# Patient Record
Sex: MALE | HISPANIC OR LATINO | Employment: FULL TIME | URBAN - METROPOLITAN AREA
[De-identification: names, ages, dates, MRNs, and addresses within clinical notes are randomized per-mention and may not be internally consistent; named-entity substitution may affect disease eponyms.]

---

## 2017-03-24 ENCOUNTER — NON-PROVIDER VISIT (OUTPATIENT)
Dept: URGENT CARE | Facility: CLINIC | Age: 22
End: 2017-03-24

## 2017-03-24 DIAGNOSIS — Z02.1 PRE-EMPLOYMENT DRUG SCREENING: ICD-10-CM

## 2017-03-24 LAB
AMP AMPHETAMINE: NORMAL
COC COCAINE: NORMAL
INT CON NEG: NORMAL
INT CON POS: NORMAL
MET METHAMPHETAMINES: NORMAL
OPI OPIATES: NORMAL
PCP PHENCYCLIDINE: NORMAL
POC DRUG COMMENT 753798-OCCUPATIONAL HEALTH: NORMAL
THC: NORMAL

## 2017-03-24 PROCEDURE — 80305 DRUG TEST PRSMV DIR OPT OBS: CPT | Performed by: PHYSICIAN ASSISTANT

## 2019-03-27 ENCOUNTER — OFFICE VISIT (OUTPATIENT)
Dept: URGENT CARE | Facility: CLINIC | Age: 24
End: 2019-03-27

## 2019-03-27 DIAGNOSIS — Z01.89 RESPIRATORY CLEARANCE EXAMINATION, ENCOUNTER FOR: ICD-10-CM

## 2019-03-27 PROCEDURE — 8916 PR CLEARANCE ONLY: Performed by: PHYSICIAN ASSISTANT

## 2019-03-27 NOTE — PROGRESS NOTES
Saúl Wallis Jasper is a 24 y.o. male here for a non-provider visit for mask fit respiratory clearance    If abnormal was an in office provider notified today (if so, indicate provider)? Yes  Routed to PCP? No

## 2019-04-06 ENCOUNTER — OCCUPATIONAL MEDICINE (OUTPATIENT)
Dept: URGENT CARE | Facility: CLINIC | Age: 24
End: 2019-04-06
Payer: COMMERCIAL

## 2019-04-06 VITALS
WEIGHT: 196 LBS | HEIGHT: 67 IN | RESPIRATION RATE: 16 BRPM | OXYGEN SATURATION: 95 % | DIASTOLIC BLOOD PRESSURE: 84 MMHG | SYSTOLIC BLOOD PRESSURE: 122 MMHG | TEMPERATURE: 99.1 F | HEART RATE: 72 BPM | BODY MASS INDEX: 30.76 KG/M2

## 2019-04-06 DIAGNOSIS — S01.81XA CHIN LACERATION, INITIAL ENCOUNTER: ICD-10-CM

## 2019-04-06 PROCEDURE — 90715 TDAP VACCINE 7 YRS/> IM: CPT | Mod: 29 | Performed by: PHYSICIAN ASSISTANT

## 2019-04-06 PROCEDURE — 90471 IMMUNIZATION ADMIN: CPT | Mod: 29 | Performed by: PHYSICIAN ASSISTANT

## 2019-04-06 PROCEDURE — 12013 RPR F/E/E/N/L/M 2.6-5.0 CM: CPT | Mod: 29 | Performed by: PHYSICIAN ASSISTANT

## 2019-04-06 ASSESSMENT — ENCOUNTER SYMPTOMS
PALPITATIONS: 0
COUGH: 0
FEVER: 0
SHORTNESS OF BREATH: 0

## 2019-04-06 NOTE — LETTER
[facility] [address] - [place] [address]  Phone:  [phone] - Fax:  [phone]   [facility] Progress Report and Disability Certification  Date of Service: 4/6/2019   No Show:  No  Date / Time of Next Visit:     Claim Information   Patient Name: Saúl Hutchinson  Claim Number:     Employer: [employer]  Date of Injury: 4/6/2019     Insurer / TPA: [place] [employer]  ID / SSN:     Occupation: [employer]  Diagnosis: The encounter diagnosis was Chin laceration, initial encounter.    Medical Information   Related to Industrial Injury? Yes    Subjective Complaints:  DOI: 4/6/2019.  Pt was at work when he was breaking stakes in the ground with a metal bar when it hit him in the chin causing a cut.  He denies LOC or headache.  Tetanus unknown. No second job.  No previous injury.   Objective Findings: Constitutional: Pt is oriented to person, place, and time. He appears well-developed and well-nourished. No distress.   HENT:   Mouth/Throat: Oropharynx is clear and moist.   Eyes: Conjunctivae are normal.   Cardiovascular: Normal rate.    Pulmonary/Chest: Effort normal.   Musculoskeletal:   Neurological: Pt is alert and oriented to person, place, and time. Coordination normal.   Skin: Skin is warm. 3 cm laceration of chin  Psychiatric: Pt has a normal mood and affect. His behavior is normal.      Pre-Existing Condition(s):     Assessment:   Initial Visit    Status: Additional Care Required  Permanent Disability:No    Plan:      Diagnostics:      Comments:       Disability Information   Status: Released to Full Duty    From:     Through:   Restrictions are:     Physical Restrictions   Sitting:    Standing:    Stooping:    Bending:      Squatting:    Walking:    Climbing:    Pushing:      Pulling:    Other:    Reaching Above Shoulder (L):   Reaching Above Shoulder (R):       Reaching Below Shoulder (L):    Reaching Below Shoulder (R):      Not to exceed Weight  Limits   Carrying(hrs):   Weight Limit(lb):   Lifting(hrs):   Weight  Limit(lb):     Comments: 4 sutures placed. TDAP given. Keep wound clean and covered with topical antibiotic ointment and bandage.  Return in 5-7 days for removal.    Repetitive Actions   Hands: i.e. Fine Manipulations from Grasping:     Feet: i.e. Operating Foot Controls:     Driving / Operate Machinery:     Physician Name: Akin Arellano P.A.-C. Physician Signature: AKIN Rendon P.A.-C. e-Signature: Dr. Hayden Lara, Medical Director   Clinic Name / Location: [address] [place] [address], [place] [address] Clinic Phone Number: Dept: [phone]   Appointment Time: 11:00 Am Visit Start Time: 11:39 AM   Check-In Time:  11:11 Am Visit Discharge Time: 12:41 PM   Original-Treating Physician or Chiropractor    Page 2-Insurer/TPA    Page 3-Employer    Page 4-Employee

## 2019-04-06 NOTE — LETTER
"EMPLOYEE’S CLAIM FOR COMPENSATION/ REPORT OF INITIAL TREATMENT  FORM C-4    EMPLOYEE’S CLAIM - PROVIDE ALL INFORMATION REQUESTED   First Name  Saúl Last Name  Jasper Birthdate                    1995                Sex  male Claim Number   Home Address  [address] Age  24 y.o. Height  1.702 m (5' 7\") Weight  88.9 kg (196 lb) [facility]     [place] Zip  [address] Telephone  [phone] (home)    Mailing Address  [address] [place] Zip  [address] Primary Language Spoken  English    Insurer  [place] [employer] Third Party   [employer] [employer]   Employee's Occupation (Job Title) When Injury or Occupational Disease Occurred  [employer]    Employer's Name  [employer]  Telephone  [phone]    Employer Address  [address]  [place] [place] [facility] [place]  Zip  [address]    Date of Injury  4/6/2019               Hour of Injury  9:00 AM Date Employer Notified  4/6/2019 Last Day of Work after Injury or Occupational Disease  4/6/2019 Supervisor to Whom Injury Reported  [facility]   Address or Location of Accident (if applicable)  [[address]   What were you doing at the time of accident? (if applicable)  Breaking stakes of ground    How did this injury or occupational disease occur? (Be specific an answer in detail. Use additional sheet if necessary)  My injury happened by breaking stakes of ground with a bar they were deep in ground   If you believe that you have an occupational disease, when did you first have knowledge of the disability and it relationship to your employment?  N/A Witnesses to the Accident  Lobo Darby      Nature of Injury or Occupational Disease  Laceration  Part(s) of Body Injured or Affected  Facial Bones, ,     I certify that the above is true and correct to the best of my knowledge and that I have provided this information in order to obtain the benefits of [facility] [employer] and " Occup[place] inclusive or Chapter 617 of [place]).  I hereby authorize any physician, chiropractor, surgeon, practitioner, or other person, any hospital, including [facility] or [facility] hospital, any medical service organization, any insurance company, or other institution or organization to release to each other, any medical or other information, including benefits paid or payable, pertinent to this injury or disease, except information relative to diagnosis, treatment and/or counseling for AIDS, psychological conditions, alcohol or controlled substances, for which I must give specific authorization.  A Photostat of this authorization shall be as valid as the original.     Date   Place   Employee’s Signature   THIS REPORT MUST BE COMPLETED AND MAILED WITHIN 3 WORKING DAYS OF TREATMENT   Place  [facility]  Name of Facility  [facility]   Date  4/6/2019 Diagnosis  (S01.81XA) Chin laceration, initial encounter Is there evidence the injured employee was under the influence of alcohol and/or another controlled substance at the time of accident?   Hour  11:39 AM Description of Injury or Disease  The encounter diagnosis was Chin laceration, initial encounter. No   Treatment  4 sutures placed. Updated Tdap.  Keep area covered and clean.  Have you advised the patient to remain off work five days or more? No   X-Ray Findings      If Yes   From Date  To Date      From information given by the employee, together with medical evidence, can you directly connect this injury or occupational disease as job incurred?  Yes If No Full Duty  Yes Modified Duty      Is additional medical care by a physician indicated?  Yes If Modified Duty, Specify any Limitations / Restrictions      Do you know of any previous injury or disease contributing to this condition or occupational disease?                            No   Date  4/6/2019 Print Doctor’s Name Akin Arellano P.A.-C. I  "certify the employer’s copy of  this form was mailed on:   Address  [address] Insurer’s Use Only     [place] Zip  [address]    Provider’s Tax ID Number  [id number] Telephone  Dept: [phone]        jade-ELLEN Abebe P.A.-C.   e-Signature: Dr. Hayden Lara, Medical Director Degree  [employer]        ORIGINAL-TREATING PHYSICIAN OR CHIROPRACTOR    PAGE 2-INSURER/TPA    PAGE 3-EMPLOYER    PAGE 4-EMPLOYEE             Form C-4 (rev10/07)              BRIEF DESCRIPTION OF RIGHTS AND BENEFITS  (Pursuant to NRS 616C.050)    Notice of Injury or Occupational Disease (Incident Report Form C-1): If an injury or occupational disease (OD) arises out of and in the  course of employment, you must provide written notice to your employer as soon as practicable, but no later than 7 days after the accident or  OD. Your employer shall maintain a sufficient supply of the required forms.    Claim for Compensation (Form C-4): If medical treatment is sought, the form C-4 is available at the place of initial treatment. A completed  \"Claim for Compensation\" (Form C-4) must be filed within 90 days after an accident or OD. The treating physician or chiropractor must,  within 3 working days after treatment, complete and mail to the employer, the employer's insurer and third-party , the Claim for  Compensation.    Medical Treatment: If you require medical treatment for your on-the-job injury or OD, you may be required to select a physician or  chiropractor from a list provided by your workers’ compensation insurer, if it has contracted with an Organization for Managed Care (MCO) or  Preferred Provider Organization (PPO) or providers of health care. If your employer has not entered into a contract with an MCO or PPO, you  may select a physician or chiropractor from the Panel of Physicians and Chiropractors. Any medical costs related to your industrial injury or  OD will be paid by your " insurer.    Temporary Total Disability (TTD): If your doctor has certified that you are unable to work for a period of at least 5 consecutive days, or 5  cumulative days in a 20-day period, or places restrictions on you that your employer does not accommodate, you may be entitled to TTD  compensation.    Temporary Partial Disability (TPD): If the wage you receive upon reemployment is less than the compensation for TTD to which you are  entitled, the insurer may be required to pay you TPD compensation to make up the difference. TPD can only be paid for a maximum of 24  months.    Permanent Partial Disability (PPD): When your medical condition is stable and there is an indication of a PPD as a result of your injury or  OD, within 30 days, your insurer must arrange for an evaluation by a rating physician or chiropractor to determine the degree of your PPD. The  amount of your PPD award depends on the date of injury, the results of the PPD evaluation and your age and wage.    Permanent Total Disability (PTD): If you are medically certified by a treating physician or chiropractor as permanently and totally disabled  and have been granted a PTD status by your insurer, you are entitled to receive monthly benefits not to exceed 66 2/3% of your average  monthly wage. The amount of your PTD payments is subject to reduction if you previously received a PPD award.    Vocational Rehabilitation Services: You may be eligible for vocational rehabilitation services if you are unable to return to the job due to a  permanent physical impairment or permanent restrictions as a result of your injury or occupational disease.    Transportation and Per Adore Reimbursement: You may be eligible for travel expenses and per adore associated with medical treatment.    Reopening: You may be able to reopen your claim if your condition worsens after claim closure.    Appeal Process: If you disagree with a written determination issued by the insurer  or the insurer does not respond to your request, you may  appeal to the Department of Administration, , by following the instructions contained in your determination letter. You must  appeal the determination within 70 days from the date of the determination letter at [address], [address], [place], [place]  [address], or [address], [address], [place], [place] [address]. If you disagree with the  decision, you may appeal to the  Department of Administration, . You must file your appeal within 30 days from the date of the  decision  letter at [address], [address], [place], [place] [address], or [address], [place], [place] [address]. If you  disagree with a decision of an , you may file a petition for judicial review with the District Court. You must do so within 30  days of the Appeal Officer’s decision. You may be represented by an  at your own expense or you may contact the [facility] for possible  representation.    [place]  for Injured Workers (NAIW): If you disagree with a  decision, you may request that NAIW represent you  without charge at an  Hearing. For information regarding denial of benefits, you may contact the [facility] at: [address], [address], [place] [address], [phone], or [address], [place] [address], [phone]    To File a Complaint with the Division: If you wish to file a complaint with the  of the Division of Industrial Relations (DIR),  please contact the Workers’ Compensation Section, [address], [address], [place], [place] [address], telephone [phone], or  [address], [place], [place] [address], telephone [phone].    For assistance with Workers’ Compensation Issues: you may contact the Office of the Governor Consumer  Health Assistance, [address], [address], [place], [place] [address], Toll Free 1-792.994.5237, Web site: http://govcha.[facility].[place].us, E-mail  [email or web address].[email or web address].                                                                                                                                                                                                                                   __________________________________________________________________                                                                   _________________                Employee Name / Signature                                                                                                                                                       Date                                                                                                                                                                                                     D-2 (rev. 10/07)

## 2019-04-06 NOTE — PROGRESS NOTES
"Subjective:      Saúl Hutchinson is a 24 y.o. male who presents with Facial Laceration (WC DOI 4/6/19- chin)          HPI  DOI: 4/6/2019.  Pt was at work when he was breaking stakes in the ground with a metal bar when it hit him in the chin causing a cut.  He denies LOC or headache.  Tetanus unknown. No second job.  No previous injury.     Review of Systems   Constitutional: Negative for fever and malaise/fatigue.   Respiratory: Negative for cough and shortness of breath.    Cardiovascular: Negative for chest pain and palpitations.   Skin:        Laceration of chin     All other systems reviewed and are negative.    PMH:  has no past medical history on file.  MEDS: No current outpatient prescriptions on file.  ALLERGIES: No Known Allergies  SURGHX: No past surgical history on file.  SOCHX:  reports that he has never smoked. He has never used smokeless tobacco.  FH: Family history was reviewed, no pertinent findings to report  Medications, Allergies, and current problem list reviewed today in Epic       Objective:     /84 (BP Location: Left arm, Patient Position: Sitting, BP Cuff Size: Adult)   Pulse 72   Temp 37.3 °C (99.1 °F) (Temporal)   Resp 16   Ht 1.702 m (5' 7\")   Wt 88.9 kg (196 lb)   SpO2 95%   BMI 30.70 kg/m²      Physical Exam    Constitutional: Pt is oriented to person, place, and time. He appears well-developed and well-nourished. No distress.   HENT:   Mouth/Throat: Oropharynx is clear and moist.   Eyes: Conjunctivae are normal.   Cardiovascular: Normal rate.    Pulmonary/Chest: Effort normal.   Musculoskeletal:   Neurological: Pt is alert and oriented to person, place, and time. Coordination normal.   Skin: Skin is warm. 3 cm laceration of chin  Psychiatric: Pt has a normal mood and affect. His behavior is normal.          Assessment/Plan:   Procedure: Laceration Repair  -Risks including bleeding, nerve damage, infection, and poor cosmetic outcome discussed at length. Benefits and " alternatives discussed.   -Sterile technique throughout  -Local anesthesia with 2% lidocaine  -Closed with # 4 6-0 Nylon interrupted sutures with good wound approximation  -Polysporin and dressing placed  -Patient tolerated well      1. Chin laceration, initial encounter  - D39/C4  - Return in 5-7 days for suture removal  - Tdap Vaccine =>8YO IM

## 2019-04-16 ENCOUNTER — OCCUPATIONAL MEDICINE (OUTPATIENT)
Dept: URGENT CARE | Facility: CLINIC | Age: 24
End: 2019-04-16
Payer: COMMERCIAL

## 2019-04-16 VITALS
OXYGEN SATURATION: 95 % | TEMPERATURE: 97.6 F | BODY MASS INDEX: 30.76 KG/M2 | WEIGHT: 196 LBS | SYSTOLIC BLOOD PRESSURE: 122 MMHG | HEART RATE: 76 BPM | RESPIRATION RATE: 14 BRPM | HEIGHT: 67 IN | DIASTOLIC BLOOD PRESSURE: 80 MMHG

## 2019-04-16 DIAGNOSIS — S01.81XD CHIN LACERATION, SUBSEQUENT ENCOUNTER: Primary | ICD-10-CM

## 2019-04-16 PROCEDURE — 99213 OFFICE O/P EST LOW 20 MIN: CPT | Mod: 29 | Performed by: NURSE PRACTITIONER

## 2019-04-16 ASSESSMENT — ENCOUNTER SYMPTOMS
CONSTITUTIONAL NEGATIVE: 1
FEVER: 0

## 2019-04-16 NOTE — PROGRESS NOTES
"Subjective:     Saúl Hutchinson is a 24 y.o. male who presents for Suture / Staple Removal (WC Fv, Suture removal, Chin lac)    DOI 4/6/2019    Initial visit in  on 4/6/2019:    \"DOI: 4/6/2019.  Pt was at work when he was breaking stakes in the ground with a metal bar when it hit him in the chin causing a cut.  He denies LOC or headache.  Tetanus unknown. No second job.  No previous injury.\"    Follow-up visit today 4/16/2019:    Patient presents for reassessment of sutures in his chin after suffering a laceration to his chin on 4/6/2019.  He was seen in urgent care on 4/6/2019 and 4 sutures were placed.  He was also given a Tdap.  Patient has been back to work and has been protecting the site with a face mask.  Denies redness, pain, swelling, fever, or tenderness or new symptoms.    PMH:  has no past medical history on file.    MEDS: No current outpatient prescriptions on file.    ALLERGIES: No Known Allergies    SURGHX: History reviewed. No pertinent surgical history.    SOCHX:  reports that he has never smoked. He has never used smokeless tobacco.     FH: Reviewed with patient, not pertinent to this visit.     Review of Systems   Constitutional: Negative.  Negative for fever.   Skin:        Chin laceration with sutures in place   All other systems reviewed and are negative.    Objective:     /80   Pulse 76   Temp 36.4 °C (97.6 °F) (Temporal)   Resp 14   Ht 1.702 m (5' 7\")   Wt 88.9 kg (196 lb)   SpO2 95%   BMI 30.70 kg/m²     Physical Exam   Constitutional: He is oriented to person, place, and time. He appears well-developed and well-nourished. He is cooperative.  Non-toxic appearance. No distress.   HENT:   Head: Normocephalic and atraumatic.   Mouth/Throat: Mucous membranes are normal.   Eyes: EOM are normal.   Neck: Normal range of motion.   Cardiovascular: Normal rate and normal pulses.    Pulmonary/Chest: Effort normal. No respiratory distress.   Musculoskeletal: Normal range of motion. " He exhibits no deformity.   Neurological: He is alert and oriented to person, place, and time. He has normal strength. No sensory deficit.   Skin: Skin is warm and dry. Capillary refill takes less than 2 seconds.   Laceration to chin with 4 sutures in place, wound edges well approximated, no redness, swelling, tenderness, or drainage   Psychiatric: He has a normal mood and affect. His behavior is normal.   Vitals reviewed.    Laceration site cleaned and 4 sutures removed.    Assessment/Plan:     1. Chin laceration, subsequent encounter    Chin laceration healing well. Sutures removed. Patient at [facility] and will be released to full duty with no restrictions. Patient advised to keep site protected for an additional 2 days as sutures were removed. Follow-up with PCP. Return if symptoms alexander  ge or worsen.    Patient advised to: Return for 1) Symptoms don't improve or worsen, or go to ER, 2) Follow up with primary care in 7-10 days.    Differential diagnosis, natural history, supportive care, and indications for immediate follow-up discussed. All questions answered. Patient agrees with the plan of care.

## 2019-04-16 NOTE — LETTER
"   [facility]  [address] [facility] [address], [address] [place] [address]  Phone:  [phone] - Fax:  [phone]   Occ[facility]ational [facility] Progress Report and Disability Certification  Date of Service: 4/16/2019   No Show:  No  Date / Time of Next Visit:     Claim Information   Patient Name: Saúl Hutchinson  Claim Number:     Employer: [employer]  Date of Injury: 4/6/2019     Insurer / TPA: [employer]  ID / SSN:     Occupation: [employer]  Diagnosis: The encounter diagnosis was Chin laceration, subsequent encounter.    Medical Information   Related to Industrial Injury? Yes    Subjective Complaints:  DOI 4/6/2019    Initial visit in  on 4/6/2019:    \"DOI: 4/6/2019.  Pt was at work when he was breaking stakes in the ground with a metal bar when it hit him in the chin causing a cut.  He denies LOC or headache.  Tetanus unknown. No second job.  No previous injury.\"    Follow-up visit today 4/16/2019:    Patient presents for reassessment of sutures in his chin after suffering a laceration to his chin on 4/6/2019.  He was seen in urgent care on 4/6/2019 and 4 sutures were placed.  He was also given a Tdap.  Patient has been back to work and has been protecting the site with a face mask.  Denies redness, pain, swelling, fever, or tenderness or new symptoms.   Objective Findings: Physical Exam   Constitutional: He is oriented to person, place, and time. He appears well-developed and well-nourished. He is cooperative.  Non-toxic appearance. No distress.   HENT:   Head: Normocephalic and atraumatic.   Mouth/Throat: Mucous membranes are normal.   Eyes: EOM are normal.   Neck: Normal range of motion.   Cardiovascular: Normal rate and normal pulses.    Pulmonary/Chest: Effort normal. No respiratory distress.   Musculoskeletal: Normal range of motion. He exhibits no deformity.   Neurological: He is alert and oriented to person, place, and time. He has normal strength. No sensory deficit.   Skin: " Skin is warm and dry. Capillary refill takes less than 2 seconds.   Laceration to chin with 4 sutures in place, wound edges well approximated, no redness, swelling, tenderness, or drainage   Psychiatric: He has a normal mood and affect. His behavior is normal.   Vitals reviewed.   Pre-Existing Condition(s):     Assessment:   Condition Improved    Status: Discharged /  MMI  Permanent Disability:No    Plan:      Diagnostics:      Comments:  Chin laceration healing well. Sutures removed. Patient at [facility] and will be released to full duty with no restrictions. Patient advised to keep site protected for an additional 2 days as sutures were removed. Follow-up with PCP. Return if symptoms alexander  ge or worsen.    Disability Information   Status: Released to Full Duty    From:  4/16/2019  Through:   Restrictions are:     Physical Restrictions   Sitting:    Standing:    Stooping:    Bending:      Squatting:    Walking:    Climbing:    Pushing:      Pulling:    Other:    Reaching Above Shoulder (L):   Reaching Above Shoulder (R):       Reaching Below Shoulder (L):    Reaching Below Shoulder (R):      Not to exceed Weight Limits   Carrying(hrs):   Weight Limit(lb):   Lifting(hrs):   Weight  Limit(lb):     Comments:      Repetitive Actions   Hands: i.e. Fine Manipulations from Grasping:     Feet: i.e. Operating Foot Controls:     Driving / Operate Machinery:     Physician Name: ANGELIQUE Aleman Physician Signature: GISEL Landeros e-Signature: Dr. Hayden Lara, Medical Director   Clinic Name / Location: [address], [address]  [place] [address] Clinic Phone Number: Dept: [phone]   Appointment Time: 12:15 Pm Visit Start Time: 12:29 PM   Check-In Time:  12:14 Pm Visit Discharge Time: 1:26 PM   Original-Treating Physician or Chiropractor    Page 2-Insurer/TPA    Page 3-Employer    Page 4-Employee